# Patient Record
Sex: FEMALE | Race: BLACK OR AFRICAN AMERICAN | Employment: UNEMPLOYED | URBAN - METROPOLITAN AREA
[De-identification: names, ages, dates, MRNs, and addresses within clinical notes are randomized per-mention and may not be internally consistent; named-entity substitution may affect disease eponyms.]

---

## 2017-10-17 ENCOUNTER — HOSPITAL ENCOUNTER (EMERGENCY)
Age: 27
Discharge: HOME | End: 2017-10-17
Attending: EMERGENCY MEDICINE
Payer: MEDICAID

## 2017-10-17 VITALS
OXYGEN SATURATION: 100 % | TEMPERATURE: 98.6 F | SYSTOLIC BLOOD PRESSURE: 132 MMHG | DIASTOLIC BLOOD PRESSURE: 76 MMHG | HEART RATE: 96 BPM | HEIGHT: 62 IN | WEIGHT: 135 LBS | RESPIRATION RATE: 16 BRPM | BODY MASS INDEX: 24.84 KG/M2

## 2017-10-17 DIAGNOSIS — N76.0 BV (BACTERIAL VAGINOSIS): ICD-10-CM

## 2017-10-17 DIAGNOSIS — T19.2XXA FOREIGN BODY IN VAGINA, INITIAL ENCOUNTER: ICD-10-CM

## 2017-10-17 DIAGNOSIS — N73.0 PID (ACUTE PELVIC INFLAMMATORY DISEASE): Primary | ICD-10-CM

## 2017-10-17 DIAGNOSIS — B96.89 BV (BACTERIAL VAGINOSIS): ICD-10-CM

## 2017-10-17 LAB
APPEARANCE UR: CLEAR
BACTERIA URNS QL MICRO: NEGATIVE /HPF
BILIRUB UR QL: NEGATIVE
COLOR UR: YELLOW
EPITH CASTS URNS QL MICRO: NORMAL /LPF (ref 0–5)
GLUCOSE UR STRIP.AUTO-MCNC: NEGATIVE MG/DL
HCG UR QL: NEGATIVE
HGB UR QL STRIP: ABNORMAL
KETONES UR QL STRIP.AUTO: NEGATIVE MG/DL
LEUKOCYTE ESTERASE UR QL STRIP.AUTO: NEGATIVE
NITRITE UR QL STRIP.AUTO: NEGATIVE
PH UR STRIP: 5.5 [PH] (ref 5–8)
PROT UR STRIP-MCNC: NEGATIVE MG/DL
RBC #/AREA URNS HPF: NORMAL /HPF (ref 0–5)
SERVICE CMNT-IMP: NORMAL
SP GR UR REFRACTOMETRY: 1.01 (ref 1–1.03)
UROBILINOGEN UR QL STRIP.AUTO: 1 EU/DL (ref 0.2–1)
WBC URNS QL MICRO: NORMAL /HPF (ref 0–4)
WET PREP GENITAL: NORMAL

## 2017-10-17 PROCEDURE — 87491 CHLMYD TRACH DNA AMP PROBE: CPT | Performed by: PHYSICIAN ASSISTANT

## 2017-10-17 PROCEDURE — 81025 URINE PREGNANCY TEST: CPT

## 2017-10-17 PROCEDURE — 87210 SMEAR WET MOUNT SALINE/INK: CPT | Performed by: PHYSICIAN ASSISTANT

## 2017-10-17 PROCEDURE — 81001 URINALYSIS AUTO W/SCOPE: CPT

## 2017-10-17 PROCEDURE — 99284 EMERGENCY DEPT VISIT MOD MDM: CPT

## 2017-10-17 RX ORDER — DOXYCYCLINE HYCLATE 100 MG
100 TABLET ORAL 2 TIMES DAILY
Qty: 20 TAB | Refills: 0 | Status: SHIPPED | OUTPATIENT
Start: 2017-10-17 | End: 2017-10-17

## 2017-10-17 RX ORDER — CLINDAMYCIN HYDROCHLORIDE 300 MG/1
300 CAPSULE ORAL 4 TIMES DAILY
Qty: 28 CAP | Refills: 0 | Status: SHIPPED | OUTPATIENT
Start: 2017-10-17 | End: 2017-10-24

## 2017-10-18 LAB
C TRACH RRNA SPEC QL NAA+PROBE: NEGATIVE
N GONORRHOEA RRNA SPEC QL NAA+PROBE: NEGATIVE
SPECIMEN SOURCE: NORMAL

## 2017-10-18 NOTE — ED PROVIDER NOTES
Patient is a 28 y.o. female presenting with female genitourinary complaint. Vaginal Pain           Past Medical History:   Diagnosis Date    Asthma        Past Surgical History:   Procedure Laterality Date    HX GYN      2          History reviewed. No pertinent family history. Social History     Social History    Marital status:      Spouse name: N/A    Number of children: N/A    Years of education: N/A     Occupational History    Not on file. Social History Main Topics    Smoking status: Current Some Day Smoker     Packs/day: 1.00     Years: 6.00    Smokeless tobacco: Never Used    Alcohol use No    Drug use: No    Sexual activity: Yes     Partners: Male     Other Topics Concern    Not on file     Social History Narrative         ALLERGIES: Review of patient's allergies indicates no known allergies. Review of Systems    Vitals:    10/17/17 2059   BP: 132/76   Pulse: 96   Resp: 16   Temp: 98.6 °F (37 °C)   SpO2: 100%   Weight: 61.2 kg (135 lb)   Height: 5' 2\" (1.575 m)            Physical Exam     MDM  Number of Diagnoses or Management Options  BV (bacterial vaginosis):   Foreign body in vagina, initial encounter:   PID (acute pelvic inflammatory disease):   Diagnosis management comments:     Impression: FB vagina, PID    Plan: discharge home  Antibiotic prescription  Increase fluids  Follow up with PCP    ED Course       Pelvic Exam  Date/Time: 10/17/2017 9:26 PM  Performed by: PA  Procedure duration:  5 minutes. Documented by:  self. Exam assisted by:  ED NEELIMA Melton. Type of exam performed: bimanual and speculum. External genitalia appearance: normal.    Vaginal exam:  odor. Cervical exam:  minimal cervical motion tenderness. Specimen(s) collected:  chlamydia, GC and vaginal culture. Bimanual exam:  left adenexal tenderness and right adenexal tenderness.     Patient tolerance: Patient tolerated the procedure well with no immediate complications  Comments: FB noted in vaginal vault            Vitals:  Patient Vitals for the past 12 hrs:   Temp Pulse Resp BP SpO2   10/17/17 2059 98.6 °F (37 °C) 96 16 132/76 100 %         Medications ordered:   Medications - No data to display      Lab findings:  Recent Results (from the past 12 hour(s))   HCG URINE, QL    Collection Time: 10/17/17  8:57 PM   Result Value Ref Range    HCG urine, Ql. NEGATIVE  NEG     URINALYSIS W/ RFLX MICROSCOPIC    Collection Time: 10/17/17  8:57 PM   Result Value Ref Range    Color YELLOW      Appearance CLEAR      Specific gravity 1.015 1.005 - 1.030      pH (UA) 5.5 5.0 - 8.0      Protein NEGATIVE  NEG mg/dL    Glucose NEGATIVE  NEG mg/dL    Ketone NEGATIVE  NEG mg/dL    Bilirubin NEGATIVE  NEG      Blood MODERATE (A) NEG      Urobilinogen 1.0 0.2 - 1.0 EU/dL    Nitrites NEGATIVE  NEG      Leukocyte Esterase NEGATIVE  NEG             X-Ray, CT or other radiology findings or impressions:  No orders to display       Progress notes, Consult notes or additional Procedure notes:       Disposition:  Diagnosis: No diagnosis found.     Disposition: discharge    Follow-up Information     None           Patient's Medications    No medications on file

## 2017-10-18 NOTE — DISCHARGE INSTRUCTIONS
Bacterial Vaginosis: Care Instructions  Your Care Instructions    Bacterial vaginosis is a type of vaginal infection. It is caused by excess growth of certain bacteria that are normally found in the vagina. Symptoms can include itching, swelling, pain when you urinate or have sex, and a gray or yellow discharge with a \"fishy\" odor. It is not considered an infection that is spread through sexual contact. Although symptoms can be annoying and uncomfortable, bacterial vaginosis does not usually cause other health problems. However, if you have it while you are pregnant, it can cause complications. While the infection may go away on its own, most doctors use antibiotics to treat it. You may have been prescribed pills or vaginal cream. With treatment, bacterial vaginosis usually clears up in 5 to 7 days. Follow-up care is a key part of your treatment and safety. Be sure to make and go to all appointments, and call your doctor if you are having problems. It's also a good idea to know your test results and keep a list of the medicines you take. How can you care for yourself at home? · Take your antibiotics as directed. Do not stop taking them just because you feel better. You need to take the full course of antibiotics. · Do not eat or drink anything that contains alcohol if you are taking metronidazole (Flagyl). · Keep using your medicine if you start your period. Use pads instead of tampons while using a vaginal cream or suppository. Tampons can absorb the medicine. · Wear loose cotton clothing. Do not wear nylon and other materials that hold body heat and moisture close to the skin. · Do not scratch. Relieve itching with a cold pack or a cool bath. · Do not wash your vaginal area more than once a day. Use plain water or a mild, unscented soap. Do not douche. When should you call for help?   Watch closely for changes in your health, and be sure to contact your doctor if:  · You have unexpected vaginal bleeding. · You have a fever. · You have new or increased pain in your vagina or pelvis. · You are not getting better after 1 week. · Your symptoms return after you finish the course of your medicine. Where can you learn more? Go to http://sonya-kathya.info/. [employer] in the search box to learn more about \"Bacterial Vaginosis: Care Instructions. \"  Current as of: October 13, 2016  Content Version: 11.3  © 8182-2056 [employer]. Care instructions adapted under license by [employer] (which disclaims liability or warranty for this information). If you have questions about a medical condition or this instruction, always ask your healthcare professional. [address] any warranty or liability for your use of this information. Object in the Vagina: Care Instructions  Your Care Instructions    If something is left in the vagina for too long, it can cause pain and irritation. This could be a tampon, a diaphragm, a pessary, or a vibrator. Sometimes, a condom comes off during sex and gets lost in the vagina. You may have bleeding, a bad smell, and a discharge from the vagina. After the object is taken out, symptoms usually go away. Follow-up care is a key part of your treatment and safety. Be sure to make and go to all appointments, and call your doctor if you are having problems. It's also a good idea to know your test results and keep a list of the medicines you take. How can you care for yourself at home? · If your doctor prescribed antibiotics, take them as directed. Do not stop taking them just because you feel better. You need to take the full course of antibiotics. · Do not use a douche or vaginal wash unless your doctor tells you to. · You can prevent future problems if you limit how long something is in your vagina. For example, change a tampon at least every 4 to 8 hours. When should you call for help?   Watch closely for changes in your health, and be sure to contact your doctor if:  · Your symptoms do not improve, or they get worse. · You have a fever. Where can you learn more? Go to http://sonya-kathya.info/. Enter R315 in the search box to learn more about \"Object in the Vagina: Care Instructions. \"  Current as of: March 20, 2017  Content Version: 11.3  © 6121-4702 [employer]. Care instructions adapted under license by [employer] (which disclaims liability or warranty for this information). If you have questions about a medical condition or this instruction, always ask your healthcare professional. [address] any warranty or liability for your use of this information.